# Patient Record
Sex: FEMALE | Race: WHITE | NOT HISPANIC OR LATINO | URBAN - METROPOLITAN AREA
[De-identification: names, ages, dates, MRNs, and addresses within clinical notes are randomized per-mention and may not be internally consistent; named-entity substitution may affect disease eponyms.]

---

## 2021-03-31 DIAGNOSIS — Z23 ENCOUNTER FOR IMMUNIZATION: ICD-10-CM

## 2021-08-24 ENCOUNTER — OFFICE VISIT (OUTPATIENT)
Dept: FAMILY MEDICINE | Facility: CLINIC | Age: 41
End: 2021-08-24
Payer: COMMERCIAL

## 2021-08-24 VITALS
WEIGHT: 227 LBS | HEIGHT: 67 IN | DIASTOLIC BLOOD PRESSURE: 88 MMHG | SYSTOLIC BLOOD PRESSURE: 120 MMHG | OXYGEN SATURATION: 97 % | BODY MASS INDEX: 35.63 KG/M2 | RESPIRATION RATE: 16 BRPM | HEART RATE: 90 BPM

## 2021-08-24 DIAGNOSIS — G43.109 MIGRAINE WITH AURA AND WITHOUT STATUS MIGRAINOSUS, NOT INTRACTABLE: Primary | ICD-10-CM

## 2021-08-24 PROBLEM — E66.9 OBESITY: Status: ACTIVE | Noted: 2021-08-24

## 2021-08-24 PROBLEM — N60.19 FIBROCYSTIC DISEASE OF BREAST: Status: ACTIVE | Noted: 2021-08-24

## 2021-08-24 PROCEDURE — 3008F BODY MASS INDEX DOCD: CPT | Performed by: FAMILY MEDICINE

## 2021-08-24 PROCEDURE — 99213 OFFICE O/P EST LOW 20 MIN: CPT | Performed by: FAMILY MEDICINE

## 2021-08-24 RX ORDER — BUTALBITAL, ACETAMINOPHEN AND CAFFEINE 50; 325; 40 MG/1; MG/1; MG/1
1 TABLET ORAL DAILY PRN
Qty: 10 TABLET | Refills: 0 | Status: SHIPPED | OUTPATIENT
Start: 2021-08-24 | End: 2024-05-06

## 2021-08-24 RX ORDER — LAMOTRIGINE 100 MG/1
TABLET ORAL DAILY
COMMUNITY
End: 2021-08-24 | Stop reason: DRUGHIGH

## 2021-08-24 RX ORDER — BUPROPION HYDROCHLORIDE 100 MG/1
100 TABLET ORAL 2 TIMES DAILY
COMMUNITY
End: 2022-08-30 | Stop reason: ALTCHOICE

## 2021-08-24 RX ORDER — LAMOTRIGINE 150 MG/1
150 TABLET ORAL DAILY
COMMUNITY
End: 2022-08-30 | Stop reason: ALTCHOICE

## 2021-08-24 RX ORDER — ALPRAZOLAM 0.5 MG/1
TABLET ORAL
COMMUNITY
Start: 2021-08-20

## 2021-08-24 RX ORDER — BUPROPION HYDROCHLORIDE 150 MG/1
TABLET ORAL
COMMUNITY
Start: 2021-08-17 | End: 2021-08-24 | Stop reason: DRUGHIGH

## 2021-08-24 RX ORDER — TRAZODONE HYDROCHLORIDE 50 MG/1
TABLET ORAL
COMMUNITY
Start: 2021-06-15 | End: 2021-08-24

## 2021-08-24 ASSESSMENT — PAIN SCALES - GENERAL: PAINLEVEL: 8

## 2021-08-24 NOTE — ASSESSMENT & PLAN NOTE
Discussed she has not had bw since 2017.  Again explained to her that Has could be due to another underlying condition.   Says her periods have been heavy & she will see Gyn.  Agrees to get bw done.

## 2021-08-24 NOTE — PROGRESS NOTES
" [facility] - [facility]  Dr. Elmira Andrews  [address] [place] [address]     Annealexjoe Rodas is a 41 y.o. female who present for   Chief Complaint   Patient presents with   • Follow-up     Migraines         Last mo had 5 HA episodes.  Been stressed. Mom w/them now & she has multiple medical problems.  Daughter back home taking a yr off.  Had an appoint for bw today but now having her period that is heavy & was almost feeling like passing out.    Wants previous completed forms corrected as she had to call off 5 times last mo & not twice.          Past Medical History:   Diagnosis Date   • Anxiety    • Depression    • Dermatitis        Past Surgical History:   Procedure Laterality Date   • REDUCTION MAMMAPLASTY         Social History     Occupational History   • Occupation:     Tobacco Use   • Smoking status: Former Smoker   • Smokeless tobacco: Never Used   Vaping Use   • Vaping Use: Never used   Substance and Sexual Activity   • Alcohol use: Never   • Drug use: Never   • Sexual activity: Not on file        Family History   Problem Relation Age of Onset   • Bipolar disorder Biological Mother    • Cervical cancer Biological Mother    • Cancer Biological Father    • Cancer Maternal Grandfather        Sertraline      Current Outpatient Medications:   •  buPROPion (WELLBUTRIN) 100 mg tablet, Take 100 mg by mouth 2 (two) times a day., Disp: , Rfl:   •  lamoTRIgine (LaMICtal) 150 mg tablet, Take 150 mg by mouth daily., Disp: , Rfl:   •  ALPRAZolam (XANAX) 0.5 mg tablet, , Disp: , Rfl:   •  butalbital-acetaminophen-caff (FIORICET, ESGIC) -40 mg per tablet, Take 1 tablet by mouth daily as needed for headaches or migraine for up to 10 days., Disp: 10 tablet, Rfl: 0    Review of Systems    Vitals:    08/24/21 1336   BP: 120/88   Pulse: 90   Resp: 16   SpO2: 97%   Weight: 103 kg (227 lb)   Height: 1.689 m (5' 6.5\")     Body mass index is 36.09 kg/m².    Physical Exam      "   Assessment   Problem List Items Addressed This Visit        Nervous    Migraine with aura, not intractable - Primary     Discussed she has not had bw since 2017.  Again explained to her that Has could be due to another underlying condition.   Says her periods have been heavy & she will see Gyn.  Agrees to get bw done.         Relevant Medications    butalbital-acetaminophen-caff (FIORICET, ESGIC) -40 mg per tablet    buPROPion (WELLBUTRIN) 100 mg tablet    lamoTRIgine (LaMICtal) 150 mg tablet              Elmira Andrews MD  8/24/2021

## 2022-02-08 ENCOUNTER — TELEPHONE (OUTPATIENT)
Dept: FAMILY MEDICINE | Facility: CLINIC | Age: 42
End: 2022-02-08
Payer: COMMERCIAL

## 2022-02-08 NOTE — TELEPHONE ENCOUNTER
Pt missed some days of work due to migraines and her HR department states they need a return to work note stating she is able to return to work tomorrow.    Urgent care can not provide a note. Is this something you would be able to help with? Thanks.

## 2022-02-08 NOTE — LETTER
February 8, 2022     Patient: Loreto Rodas  YOB: 1980  Date of Visit: 2/8/2022    To Whom it May Concern:     Please excuse Loreto for her absence from work as she was under our care. Loreto Rodas, may return to work tomorrow 02/09/2022 without any restrictions.    If you have any questions or concerns, please don't hesitate to call.         Sincerely,       Sathya Lopez RN          CC: No Recipients

## 2022-02-18 ENCOUNTER — TELEPHONE (OUTPATIENT)
Dept: FAMILY MEDICINE | Facility: CLINIC | Age: 42
End: 2022-02-18
Payer: COMMERCIAL

## 2022-02-18 NOTE — TELEPHONE ENCOUNTER
He was the doctor from her employer.  Her previous intermittent leave was up to February 10, 2022.  He was inquiring whether it needed to be extended and the frequency.  Please call patient and schedule a follow-up appointment to address above.

## 2022-05-18 ENCOUNTER — OFFICE VISIT (OUTPATIENT)
Dept: FAMILY MEDICINE | Facility: CLINIC | Age: 42
End: 2022-05-18
Payer: COMMERCIAL

## 2022-05-18 VITALS
BODY MASS INDEX: 39.99 KG/M2 | HEIGHT: 65 IN | TEMPERATURE: 97.6 F | SYSTOLIC BLOOD PRESSURE: 120 MMHG | RESPIRATION RATE: 16 BRPM | OXYGEN SATURATION: 97 % | WEIGHT: 240 LBS | HEART RATE: 113 BPM | DIASTOLIC BLOOD PRESSURE: 100 MMHG

## 2022-05-18 DIAGNOSIS — F32.A DEPRESSION, UNSPECIFIED DEPRESSION TYPE: Primary | ICD-10-CM

## 2022-05-18 DIAGNOSIS — G43.109 MIGRAINE WITH AURA AND WITHOUT STATUS MIGRAINOSUS, NOT INTRACTABLE: ICD-10-CM

## 2022-05-18 DIAGNOSIS — E66.812 CLASS 2 OBESITY WITHOUT SERIOUS COMORBIDITY WITH BODY MASS INDEX (BMI) OF 39.0 TO 39.9 IN ADULT, UNSPECIFIED OBESITY TYPE: ICD-10-CM

## 2022-05-18 DIAGNOSIS — R03.0 ELEVATED BLOOD PRESSURE READING: ICD-10-CM

## 2022-05-18 PROCEDURE — 3008F BODY MASS INDEX DOCD: CPT | Performed by: FAMILY MEDICINE

## 2022-05-18 PROCEDURE — 99214 OFFICE O/P EST MOD 30 MIN: CPT | Performed by: FAMILY MEDICINE

## 2022-05-18 NOTE — PROGRESS NOTES
" [facility] - [facility]  Dr. Elmira Andrews  [address] [place] [address]     Annealexjoe Rodas is a 42 y.o. female who present for   Chief Complaint   Patient presents with   • Forms/questionnaires     FMLA form to be filled out.         She is now sleeping separately in another room and may soon go through a divorce.  Very stressed.  Headaches several times a week.          Past Medical History:   Diagnosis Date   • Anxiety    • Depression    • Dermatitis        Past Surgical History:   Procedure Laterality Date   • REDUCTION MAMMAPLASTY         Social History     Occupational History   • Occupation:     Tobacco Use   • Smoking status: Former Smoker   • Smokeless tobacco: Never Used   Vaping Use   • Vaping Use: Never used   Substance and Sexual Activity   • Alcohol use: Never   • Drug use: Never   • Sexual activity: Not on file        Family History   Problem Relation Age of Onset   • Bipolar disorder Biological Mother    • Cervical cancer Biological Mother    • Uterine cancer Biological Mother    • Cancer Biological Father    • Cancer Maternal Grandfather        Sertraline      Current Outpatient Medications:   •  ALPRAZolam (XANAX) 0.5 mg tablet, , Disp: , Rfl:   •  buPROPion (WELLBUTRIN) 100 mg tablet, Take 100 mg by mouth 2 (two) times a day., Disp: , Rfl:   •  butalbital-acetaminophen-caff (FIORICET, ESGIC) -40 mg per tablet, Take 1 tablet by mouth daily as needed for headaches or migraine for up to 10 days., Disp: 10 tablet, Rfl: 0  •  lamoTRIgine (LaMICtal) 150 mg tablet, Take 150 mg by mouth daily., Disp: , Rfl:     Review of Systems   Genitourinary: Positive for menstrual problem (erratic, heavy, absent).       Vitals:    05/18/22 1515   BP: (!) 120/100   Pulse: (!) 113   Resp: 16   Temp: 36.4 °C (97.6 °F)   SpO2: 97%   Weight: 109 kg (240 lb)   Height: 1.651 m (5' 5\")     Body mass index is 39.94 kg/m².    Physical Exam  Vitals reviewed.   Eyes:      " Conjunctiva/sclera: Conjunctivae normal.   Cardiovascular:      Rate and Rhythm: Normal rate and regular rhythm.      Heart sounds: Normal heart sounds.   Abdominal:      Palpations: Abdomen is soft.      Tenderness: There is no abdominal tenderness.   Musculoskeletal:      Cervical back: Neck supple.      Right lower leg: No edema.      Left lower leg: No edema.   Neurological:      Mental Status: She is alert and oriented to person, place, and time.             Assessment   Problem List Items Addressed This Visit        Nervous    Migraine with aura, not intractable     Form completed.  Lab slip given for blood work.           Relevant Orders    CBC and Differential    Comprehensive metabolic panel    Lipid panel    TSH 3rd Generation    Urinalysis with Reflex Culture (ED and Outpatient only)       Circulatory    Elevated blood pressure reading     Patient made aware.  She will continue to monitor blood pressure.              Endocrine/Metabolic    Obesity       Other    Depression - Primary     Followed by psych.           Relevant Orders    CBC and Differential    Comprehensive metabolic panel    Lipid panel    TSH 3rd Generation    Urinalysis with Reflex Culture (ED and Outpatient only)              Elmira Andrews MD  5/24/2022

## 2022-06-10 ENCOUNTER — DOCUMENTATION (OUTPATIENT)
Dept: FAMILY MEDICINE | Facility: CLINIC | Age: 42
End: 2022-06-10
Payer: COMMERCIAL

## 2022-06-10 NOTE — PROGRESS NOTES
The doctor working for her employer year call to clarify the FMLA papers.  Approved 5-7 times a month for days ago for headaches and once a month for office visits.

## 2022-08-20 LAB
ALBUMIN SERPL-MCNC: 4.1 G/DL (ref 3.6–5.1)
ALBUMIN/GLOB SERPL: 1.7 (CALC) (ref 1–2.5)
ALP SERPL-CCNC: 71 U/L (ref 31–125)
ALT SERPL-CCNC: 31 U/L (ref 6–29)
APPEARANCE UR: CLEAR
AST SERPL-CCNC: 21 U/L (ref 10–30)
BACTERIA #/AREA URNS HPF: ABNORMAL /HPF
BACTERIA UR CULT: NORMAL
BASOPHILS # BLD AUTO: 49 CELLS/UL (ref 0–200)
BASOPHILS NFR BLD AUTO: 0.6 %
BILIRUB SERPL-MCNC: 0.6 MG/DL (ref 0.2–1.2)
BILIRUB UR QL STRIP: NEGATIVE
BUN SERPL-MCNC: 11 MG/DL (ref 7–25)
BUN/CREAT SERPL: ABNORMAL (CALC) (ref 6–22)
CALCIUM SERPL-MCNC: 9.2 MG/DL (ref 8.6–10.2)
CHLORIDE SERPL-SCNC: 104 MMOL/L (ref 98–110)
CHOLEST SERPL-MCNC: 200 MG/DL
CHOLEST/HDLC SERPL: 2.8 (CALC)
CO2 SERPL-SCNC: 24 MMOL/L (ref 20–32)
COLOR UR: YELLOW
CREAT SERPL-MCNC: 0.79 MG/DL (ref 0.5–0.99)
EGFRCR SERPLBLD CKD-EPI 2021: 97 ML/MIN/1.73M2
EOSINOPHIL # BLD AUTO: 162 CELLS/UL (ref 15–500)
EOSINOPHIL NFR BLD AUTO: 2 %
ERYTHROCYTE [DISTWIDTH] IN BLOOD BY AUTOMATED COUNT: 13.6 % (ref 11–15)
GLOBULIN SER CALC-MCNC: 2.4 G/DL (CALC) (ref 1.9–3.7)
GLUCOSE SERPL-MCNC: 92 MG/DL (ref 65–99)
GLUCOSE UR QL STRIP: NEGATIVE
HCT VFR BLD AUTO: 38.1 % (ref 35–45)
HDLC SERPL-MCNC: 72 MG/DL
HGB BLD-MCNC: 12.5 G/DL (ref 11.7–15.5)
HGB UR QL STRIP: NEGATIVE
HYALINE CASTS #/AREA URNS LPF: ABNORMAL /LPF
KETONES UR QL STRIP: ABNORMAL
LDLC SERPL CALC-MCNC: 111 MG/DL (CALC)
LEUKOCYTE ESTERASE UR QL STRIP: NEGATIVE
LYMPHOCYTES # BLD AUTO: 2228 CELLS/UL (ref 850–3900)
LYMPHOCYTES NFR BLD AUTO: 27.5 %
MCH RBC QN AUTO: 29.5 PG (ref 27–33)
MCHC RBC AUTO-ENTMCNC: 32.8 G/DL (ref 32–36)
MCV RBC AUTO: 89.9 FL (ref 80–100)
MONOCYTES # BLD AUTO: 583 CELLS/UL (ref 200–950)
MONOCYTES NFR BLD AUTO: 7.2 %
NEUTROPHILS # BLD AUTO: 5079 CELLS/UL (ref 1500–7800)
NEUTROPHILS NFR BLD AUTO: 62.7 %
NITRITE UR QL STRIP: NEGATIVE
NONHDLC SERPL-MCNC: 128 MG/DL (CALC)
PH UR STRIP: 6 [PH] (ref 5–8)
PLATELET # BLD AUTO: 303 THOUSAND/UL (ref 140–400)
PMV BLD REES-ECKER: 10.5 FL (ref 7.5–12.5)
POTASSIUM SERPL-SCNC: 4.2 MMOL/L (ref 3.5–5.3)
PROT SERPL-MCNC: 6.5 G/DL (ref 6.1–8.1)
PROT UR QL STRIP: NEGATIVE
RBC # BLD AUTO: 4.24 MILLION/UL (ref 3.8–5.1)
RBC #/AREA URNS HPF: ABNORMAL /HPF
SODIUM SERPL-SCNC: 138 MMOL/L (ref 135–146)
SP GR UR STRIP: 1.02 (ref 1–1.03)
SQUAMOUS #/AREA URNS HPF: ABNORMAL /HPF
TRIGL SERPL-MCNC: 76 MG/DL
TSH SERPL-ACNC: 4.24 MIU/L
WBC # BLD AUTO: 8.1 THOUSAND/UL (ref 3.8–10.8)
WBC #/AREA URNS HPF: ABNORMAL /HPF

## 2022-08-30 ENCOUNTER — OFFICE VISIT (OUTPATIENT)
Dept: FAMILY MEDICINE | Facility: CLINIC | Age: 42
End: 2022-08-30
Payer: COMMERCIAL

## 2022-08-30 VITALS
SYSTOLIC BLOOD PRESSURE: 100 MMHG | DIASTOLIC BLOOD PRESSURE: 70 MMHG | BODY MASS INDEX: 39.92 KG/M2 | WEIGHT: 239.6 LBS | RESPIRATION RATE: 16 BRPM | HEART RATE: 84 BPM | OXYGEN SATURATION: 98 % | HEIGHT: 65 IN

## 2022-08-30 DIAGNOSIS — F32.A DEPRESSION, UNSPECIFIED DEPRESSION TYPE: ICD-10-CM

## 2022-08-30 DIAGNOSIS — Z12.31 ENCOUNTER FOR SCREENING MAMMOGRAM FOR MALIGNANT NEOPLASM OF BREAST: ICD-10-CM

## 2022-08-30 DIAGNOSIS — G43.109 MIGRAINE WITH AURA AND WITHOUT STATUS MIGRAINOSUS, NOT INTRACTABLE: Primary | ICD-10-CM

## 2022-08-30 PROCEDURE — 99213 OFFICE O/P EST LOW 20 MIN: CPT | Performed by: FAMILY MEDICINE

## 2022-08-30 PROCEDURE — 3008F BODY MASS INDEX DOCD: CPT | Performed by: FAMILY MEDICINE

## 2022-08-30 RX ORDER — TRAZODONE HYDROCHLORIDE 50 MG/1
TABLET ORAL
COMMUNITY

## 2022-08-30 RX ORDER — LURASIDONE HYDROCHLORIDE 20 MG/1
TABLET, FILM COATED ORAL
COMMUNITY
End: 2022-12-20 | Stop reason: ALTCHOICE

## 2022-08-30 ASSESSMENT — PATIENT HEALTH QUESTIONNAIRE - PHQ9: SUM OF ALL RESPONSES TO PHQ9 QUESTIONS 1 & 2: 0

## 2022-08-30 NOTE — PROGRESS NOTES
[facility] - [facility]  Dr. Elmira Andrews  [address] [place] [address]     Annealexjoe Rodas is a 42 y.o. female who present for   Chief Complaint   Patient presents with   • Follow-up        Patient here to get [facility] papers completed.  She continues to live in the same house with her  who she is  from.  Both her children are getting therapy.  Her  was recently diagnosed with diabetes when he asked her for some care.  She is seeing therapist and psychiatrist.  Her mood has stabilized with the addition of Latuda.  She takes trazodone as needed.  Patient thinks her liver enzyme was slightly up to 2 and increase in alcohol intake.  She is going on a retreat which is going to be a health retreat to take care of herself.          Past Medical History:   Diagnosis Date   • Anxiety    • Depression    • Dermatitis        Past Surgical History:   Procedure Laterality Date   • REDUCTION MAMMAPLASTY         Social History     Occupational History   • Occupation:     Tobacco Use   • Smoking status: Former Smoker   • Smokeless tobacco: Never Used   Vaping Use   • Vaping Use: Never used   Substance and Sexual Activity   • Alcohol use: Never   • Drug use: Never   • Sexual activity: Defer        Family History   Problem Relation Age of Onset   • Bipolar disorder Biological Mother    • Cervical cancer Biological Mother    • Uterine cancer Biological Mother    • Cancer Biological Father    • Cancer Maternal Grandfather        Sertraline      Current Outpatient Medications:   •  ALPRAZolam (XANAX) 0.5 mg tablet, , Disp: , Rfl:   •  lurasidone (LATUDA) 20 mg tablet, Latuda 20 mg tablet, Disp: , Rfl:   •  traZODone (DESYREL) 50 mg tablet, trazodone 50 mg tablet, Disp: , Rfl:   •  butalbital-acetaminophen-caff (FIORICET, ESGIC) -40 mg per tablet, Take 1 tablet by mouth daily as needed for headaches or migraine for up to 10 days., Disp: 10 tablet, Rfl: 0    Review  "of Systems    Vitals:    08/30/22 1036   BP: 100/70   Pulse: 84   Resp: 16   SpO2: 98%   Weight: 109 kg (239 lb 9.6 oz)   Height: 1.651 m (5' 5\")     Body mass index is 39.87 kg/m².    Physical Exam  Vitals reviewed.   Eyes:      Conjunctiva/sclera: Conjunctivae normal.   Cardiovascular:      Rate and Rhythm: Normal rate and regular rhythm.      Heart sounds: Normal heart sounds.   Pulmonary:      Effort: Pulmonary effort is normal.      Breath sounds: Normal breath sounds.   Abdominal:      Palpations: Abdomen is soft.      Tenderness: There is no abdominal tenderness.   Musculoskeletal:      Cervical back: Neck supple.      Right lower leg: No edema.      Left lower leg: No edema.   Neurological:      Mental Status: She is alert and oriented to person, place, and time.             Assessment   Problem List Items Addressed This Visit        Mental Health    Depression    Relevant Medications    traZODone (DESYREL) 50 mg tablet    lurasidone (LATUDA) 20 mg tablet       Other    Migraine with aura, not intractable - Primary     Form completed.           Relevant Medications    traZODone (DESYREL) 50 mg tablet    Encounter for screening mammogram for malignant neoplasm of breast    Relevant Orders    BI SCREENING MAMMOGRAM BILATERAL(TOMOSYNTHESIS)              Elmira Andrews MD  8/30/2022     "

## 2022-12-15 ENCOUNTER — OFFICE VISIT (OUTPATIENT)
Dept: FAMILY MEDICINE | Facility: CLINIC | Age: 42
End: 2022-12-15
Payer: COMMERCIAL

## 2022-12-15 VITALS
DIASTOLIC BLOOD PRESSURE: 80 MMHG | TEMPERATURE: 97.9 F | HEIGHT: 65 IN | BODY MASS INDEX: 40.45 KG/M2 | OXYGEN SATURATION: 98 % | WEIGHT: 242.8 LBS | SYSTOLIC BLOOD PRESSURE: 120 MMHG | RESPIRATION RATE: 15 BRPM | HEART RATE: 76 BPM

## 2022-12-15 DIAGNOSIS — G43.109 MIGRAINE WITH AURA AND WITHOUT STATUS MIGRAINOSUS, NOT INTRACTABLE: Primary | ICD-10-CM

## 2022-12-15 DIAGNOSIS — F32.A DEPRESSION, UNSPECIFIED DEPRESSION TYPE: ICD-10-CM

## 2022-12-15 DIAGNOSIS — E66.812 CLASS 2 OBESITY WITHOUT SERIOUS COMORBIDITY WITH BODY MASS INDEX (BMI) OF 39.0 TO 39.9 IN ADULT, UNSPECIFIED OBESITY TYPE: ICD-10-CM

## 2022-12-15 PROCEDURE — 3008F BODY MASS INDEX DOCD: CPT | Performed by: FAMILY MEDICINE

## 2022-12-15 PROCEDURE — 99213 OFFICE O/P EST LOW 20 MIN: CPT | Performed by: FAMILY MEDICINE

## 2022-12-15 NOTE — PROGRESS NOTES
[facility] - [facility]  Dr. Elmira Andrews  [address] [place] [address]     Annealexjoe Rodas is a 42 y.o. female who present for   Chief Complaint   Patient presents with   • Follow-up     Paperwork for fmla  Flu shot  Discuss migraine med         She is doing much better with her  after the therapy.  Has taken some days of in October due to recurrent headaches..          Past Medical History:   Diagnosis Date   • Anxiety    • Depression    • Dermatitis        Past Surgical History:   Procedure Laterality Date   • REDUCTION MAMMAPLASTY         Social History     Occupational History   • Occupation:     Tobacco Use   • Smoking status: Former   • Smokeless tobacco: Never   Vaping Use   • Vaping Use: Never used   Substance and Sexual Activity   • Alcohol use: Never   • Drug use: Never   • Sexual activity: Defer        Family History   Problem Relation Age of Onset   • Bipolar disorder Biological Mother    • Cervical cancer Biological Mother    • Uterine cancer Biological Mother    • Cancer Biological Father    • Cancer Maternal Grandfather        Sertraline      Current Outpatient Medications:   •  ALPRAZolam (XANAX) 0.5 mg tablet, , Disp: , Rfl:   •  traZODone (DESYREL) 50 mg tablet, trazodone 50 mg tablet, Disp: , Rfl:   •  butalbital-acetaminophen-caff (FIORICET, ESGIC) -40 mg per tablet, Take 1 tablet by mouth daily as needed for headaches or migraine for up to 10 days., Disp: 10 tablet, Rfl: 0    Review of Systems   Constitutional: Negative.    HENT: Negative.    Eyes: Negative.    Respiratory: Negative.    Cardiovascular: Negative.    Gastrointestinal: Negative.    Endocrine: Negative.    Genitourinary: Negative.    Musculoskeletal: Negative.    Skin: Negative.    Allergic/Immunologic: Negative.    Neurological: Positive for headaches.   Hematological: Negative.    Psychiatric/Behavioral: The patient is nervous/anxious.        Vitals:    12/15/22 1411  "  BP: 120/80   BP Location: Right upper arm   Patient Position: Sitting   Pulse: 76   Resp: 15   Temp: 36.6 °C (97.9 °F)   TempSrc: Oral   SpO2: 98%   Weight: 110 kg (242 lb 12.8 oz)   Height: 1.651 m (5' 5\")     Body mass index is 40.4 kg/m².    Physical Exam  Vitals reviewed.   Constitutional:       Appearance: Normal appearance.   HENT:      Right Ear: Tympanic membrane normal.      Left Ear: Tympanic membrane normal.      Mouth/Throat:      Mouth: Mucous membranes are moist.   Eyes:      Conjunctiva/sclera: Conjunctivae normal.   Cardiovascular:      Rate and Rhythm: Normal rate.      Heart sounds: Normal heart sounds. No murmur heard.  Pulmonary:      Effort: Pulmonary effort is normal.      Breath sounds: Normal breath sounds. No rales.   Abdominal:      General: Bowel sounds are normal.      Palpations: Abdomen is soft.      Tenderness: There is no abdominal tenderness.   Musculoskeletal:         General: Normal range of motion.      Cervical back: Normal range of motion.      Right lower leg: No edema.      Left lower leg: No edema.   Lymphadenopathy:      Cervical: No cervical adenopathy.   Skin:     General: Skin is warm.   Neurological:      General: No focal deficit present.      Mental Status: She is alert and oriented to person, place, and time.   Psychiatric:         Mood and Affect: Mood normal.             Assessment   Problem List Items Addressed This Visit        Endocrine/Metabolic    Obesity     Discussed diet and exercises.         Relevant Orders    Ambulatory referral to [facility]       Mental Health    Depression     Sees psychiatry.            Other    Migraine with aura, not intractable - Primary     FMLA papers completed.                Elmira Andrews MD  12/20/2022     "

## 2022-12-20 ASSESSMENT — ENCOUNTER SYMPTOMS
EYES NEGATIVE: 1
NERVOUS/ANXIOUS: 1
MUSCULOSKELETAL NEGATIVE: 1
ENDOCRINE NEGATIVE: 1
HEADACHES: 1
GASTROINTESTINAL NEGATIVE: 1
CARDIOVASCULAR NEGATIVE: 1
RESPIRATORY NEGATIVE: 1
HEMATOLOGIC/LYMPHATIC NEGATIVE: 1
ALLERGIC/IMMUNOLOGIC NEGATIVE: 1
CONSTITUTIONAL NEGATIVE: 1

## 2023-05-19 ENCOUNTER — CONSULT (OUTPATIENT)
Dept: FAMILY MEDICINE | Facility: CLINIC | Age: 43
End: 2023-05-19
Payer: COMMERCIAL

## 2023-05-19 VITALS
RESPIRATION RATE: 16 BRPM | BODY MASS INDEX: 39.12 KG/M2 | TEMPERATURE: 97.6 F | HEIGHT: 66 IN | HEART RATE: 103 BPM | WEIGHT: 243.4 LBS | OXYGEN SATURATION: 99 % | DIASTOLIC BLOOD PRESSURE: 82 MMHG | SYSTOLIC BLOOD PRESSURE: 140 MMHG

## 2023-05-19 DIAGNOSIS — R94.31 ABNORMAL EKG: ICD-10-CM

## 2023-05-19 DIAGNOSIS — Z01.818 PREOP EXAMINATION: ICD-10-CM

## 2023-05-19 DIAGNOSIS — E66.812 CLASS 2 OBESITY WITHOUT SERIOUS COMORBIDITY WITH BODY MASS INDEX (BMI) OF 39.0 TO 39.9 IN ADULT, UNSPECIFIED OBESITY TYPE: ICD-10-CM

## 2023-05-19 DIAGNOSIS — I10 HYPERTENSION, UNSPECIFIED TYPE: Primary | ICD-10-CM

## 2023-05-19 PROBLEM — R03.0 ELEVATED BLOOD PRESSURE READING: Status: RESOLVED | Noted: 2022-05-18 | Resolved: 2023-05-19

## 2023-05-19 PROCEDURE — 3079F DIAST BP 80-89 MM HG: CPT | Performed by: FAMILY MEDICINE

## 2023-05-19 PROCEDURE — 99214 OFFICE O/P EST MOD 30 MIN: CPT | Performed by: FAMILY MEDICINE

## 2023-05-19 PROCEDURE — 93000 ELECTROCARDIOGRAM COMPLETE: CPT | Performed by: FAMILY MEDICINE

## 2023-05-19 PROCEDURE — 3008F BODY MASS INDEX DOCD: CPT | Performed by: FAMILY MEDICINE

## 2023-05-19 PROCEDURE — 3077F SYST BP >= 140 MM HG: CPT | Performed by: FAMILY MEDICINE

## 2023-05-19 RX ORDER — METOPROLOL SUCCINATE 25 MG/1
25 TABLET, EXTENDED RELEASE ORAL DAILY
Qty: 90 TABLET | Refills: 1 | Status: SHIPPED
Start: 2023-05-19 | End: 2023-12-22

## 2023-05-19 NOTE — PROGRESS NOTES
[facility]  Dr. Elmira Andrews  [address] [place] [address]     Loreto Rodas is a 43 y.o. female who present for   Chief Complaint   Patient presents with   • Pre-op Exam        Pt presents for a Pre-Op exam for upciming surgery.  Requesting Physician:Dr Grove  Reason for request: Medical clearance  Surgery or Procedure to be done: D&C, hysteroscopy  Blood pressure log reviewed, diastolic mostly high.          Past Medical History:   Diagnosis Date   • Anxiety    • Depression    • Dermatitis        Past Surgical History:   Procedure Laterality Date   • REDUCTION MAMMAPLASTY         Social History     Occupational History   • Occupation:     Tobacco Use   • Smoking status: Former   • Smokeless tobacco: Never   Vaping Use   • Vaping status: Never Used     Passive vaping exposure: Yes   Substance and Sexual Activity   • Alcohol use: Never   • Drug use: Never   • Sexual activity: Defer        Family History   Problem Relation Age of Onset   • Bipolar disorder Biological Mother    • Cervical cancer Biological Mother    • Uterine cancer Biological Mother    • Cancer Biological Father    • Cancer Maternal Grandfather        Sertraline      Current Outpatient Medications:   •  ALPRAZolam (XANAX) 0.5 mg tablet, , Disp: , Rfl:   •  metoprolol succinate XL (TOPROL-XL) 25 mg 24 hr tablet, Take 1 tablet (25 mg total) by mouth daily., Disp: 90 tablet, Rfl: 1  •  traZODone (DESYREL) 50 mg tablet, trazodone 50 mg tablet, Disp: , Rfl:   •  butalbital-acetaminophen-caff (FIORICET, ESGIC) -40 mg per tablet, Take 1 tablet by mouth daily as needed for headaches or migraine for up to 10 days., Disp: 10 tablet, Rfl: 0    Review of Systems   Genitourinary: Positive for menstrual problem (Heavy menses, intermenstrual bleeding).       Vitals:    05/19/23 1453   BP: 140/82   BP Location: Right upper arm   Patient Position: Sitting   Pulse: (!) 103   Resp: 16   Temp:  "36.4 °C (97.6 °F)   TempSrc: Oral   SpO2: 99%   Weight: 110 kg (243 lb 6.4 oz)   Height: 1.664 m (5' 5.5\")     Body mass index is 39.89 kg/m².    Physical Exam  Vitals reviewed.   Constitutional:       Appearance: Normal appearance.   HENT:      Right Ear: Tympanic membrane normal.      Left Ear: Tympanic membrane normal.      Mouth/Throat:      Mouth: Mucous membranes are moist.   Eyes:      Conjunctiva/sclera: Conjunctivae normal.   Cardiovascular:      Rate and Rhythm: Normal rate.      Heart sounds: Normal heart sounds. No murmur heard.  Pulmonary:      Effort: Pulmonary effort is normal.      Breath sounds: Normal breath sounds. No rales.   Abdominal:      General: Bowel sounds are normal.      Palpations: Abdomen is soft.      Tenderness: There is no abdominal tenderness.   Musculoskeletal:         General: Normal range of motion.      Cervical back: Normal range of motion.      Right lower leg: No edema.      Left lower leg: No edema.   Lymphadenopathy:      Cervical: No cervical adenopathy.   Skin:     General: Skin is warm.   Neurological:      General: No focal deficit present.      Mental Status: She is alert and oriented to person, place, and time.   Psychiatric:         Mood and Affect: Mood normal.             Assessment   Problem List Items Addressed This Visit        Circulatory    Hypertension - Primary     She agreed to start with metoprolol.         Relevant Medications    metoprolol succinate XL (TOPROL-XL) 25 mg 24 hr tablet    Other Relevant Orders    ECG 12 LEAD OFFICE PERFORMED (Completed)    Transthoracic echo (TTE) complete    CBC and Differential    Comprehensive metabolic panel    Lipid panel    TSH 3rd Generation    Urinalysis with Reflex Culture (ED and Outpatient only)    Hemoglobin A1c    Abnormal EKG     She agreed to get the echocardiogram done.         Relevant Orders    Transthoracic echo (TTE) complete    CBC and Differential    Comprehensive metabolic panel    Lipid panel    TSH " 3rd Generation    Urinalysis with Reflex Culture (ED and Outpatient only)    Hemoglobin A1c       Endocrine/Metabolic    Obesity     Advised to try orlistat over-the-counter.         Relevant Orders    CBC and Differential    Comprehensive metabolic panel    Lipid panel    TSH 3rd Generation    Urinalysis with Reflex Culture (ED and Outpatient only)    Hemoglobin A1c       Other    Preop examination     Form completed.  Procedure will be done at the hospital.                Elmira Andrews MD  5/19/2023

## 2023-08-11 ENCOUNTER — TELEPHONE (OUTPATIENT)
Dept: FAMILY MEDICINE | Facility: CLINIC | Age: 43
End: 2023-08-11
Payer: COMMERCIAL

## 2023-08-11 NOTE — TELEPHONE ENCOUNTER
Dr. Santiago called regarding patient he wanted to know if you are willing to extend the [place] Certification for patient? which has an expiration date of 8/29/2023. Also if so how long the extension will be for and also if there are any changes or frequencies to the duration of flare ups.     Dr. Santiago says if you cannot reach him directly that you can leave him a detailed message as it is his direct line and it is secure. Phone # : [phone].

## 2023-08-22 ENCOUNTER — TELEPHONE (OUTPATIENT)
Dept: FAMILY MEDICINE | Facility: CLINIC | Age: 43
End: 2023-08-22
Payer: COMMERCIAL

## 2023-08-22 NOTE — TELEPHONE ENCOUNTER
Dr. Santiago called about [facility] for this patient. He has some questions and wants you to give him a call back. He can be reached at [phone] and said it is time sensitive.

## 2023-09-19 ENCOUNTER — OFFICE VISIT (OUTPATIENT)
Dept: FAMILY MEDICINE | Facility: CLINIC | Age: 43
End: 2023-09-19
Payer: COMMERCIAL

## 2023-09-19 VITALS
BODY MASS INDEX: 38.59 KG/M2 | TEMPERATURE: 98.1 F | OXYGEN SATURATION: 98 % | WEIGHT: 240.13 LBS | DIASTOLIC BLOOD PRESSURE: 82 MMHG | HEART RATE: 72 BPM | SYSTOLIC BLOOD PRESSURE: 118 MMHG | HEIGHT: 66 IN

## 2023-09-19 DIAGNOSIS — G43.119 INTRACTABLE MIGRAINE WITH AURA WITHOUT STATUS MIGRAINOSUS: Primary | ICD-10-CM

## 2023-09-19 DIAGNOSIS — F32.A DEPRESSION, UNSPECIFIED DEPRESSION TYPE: ICD-10-CM

## 2023-09-19 DIAGNOSIS — F41.9 ANXIETY: ICD-10-CM

## 2023-09-19 PROCEDURE — 3008F BODY MASS INDEX DOCD: CPT | Performed by: FAMILY MEDICINE

## 2023-09-19 PROCEDURE — 99214 OFFICE O/P EST MOD 30 MIN: CPT | Performed by: FAMILY MEDICINE

## 2023-09-19 PROCEDURE — 3079F DIAST BP 80-89 MM HG: CPT | Performed by: FAMILY MEDICINE

## 2023-09-19 PROCEDURE — 3074F SYST BP LT 130 MM HG: CPT | Performed by: FAMILY MEDICINE

## 2023-09-19 RX ORDER — ESCITALOPRAM OXALATE 10 MG/1
TABLET ORAL
COMMUNITY
Start: 2023-07-25 | End: 2024-05-06

## 2023-09-19 NOTE — ASSESSMENT & PLAN NOTE
I have deferred giving her Fioricet.  Referred her to neurologist.  Discussed she needs to get blood work done prior to the forms being completed.  She agreed to do so within the next few days.

## 2023-09-19 NOTE — ASSESSMENT & PLAN NOTE
She has been getting 90 pills of Xanax every month.  Says she takes them when she feels the anxiety coming on which is frequent.  Prescribed by psychiatrist.

## 2023-09-19 NOTE — PROGRESS NOTES
[facility] - [facility]  Dr. Elmira Andrews  [address] [place] [address]     Loreto Rodas is a 43 y.o. female who present for   Chief Complaint   Patient presents with    FMLA        5 Has in Aug & 2 this mo.  Says she saw the  who recommended her to go off of the metoprolol as her blood pressure was very low.  She has been checking it at home and it has been normal.  Her relationship with her  has been much better, going for therapy.  She is going to [place] with him and son soon.  Patient would like to take a few pills of Fioricet to be taken if needed.          Past Medical History:   Diagnosis Date    Anxiety     Depression     Dermatitis     Intractable migraine with aura 8/24/2021       Past Surgical History:   Procedure Laterality Date    REDUCTION MAMMAPLASTY         Social History     Occupational History    Occupation:     Tobacco Use    Smoking status: Former    Smokeless tobacco: Never   Vaping Use    Vaping Use: Never used   Substance and Sexual Activity    Alcohol use: Never    Drug use: Never    Sexual activity: Defer        Family History   Problem Relation Age of Onset    Bipolar disorder Biological Mother     Cervical cancer Biological Mother     Uterine cancer Biological Mother     Cancer Biological Father     Cancer Maternal Grandfather        Sertraline      Current Outpatient Medications:     ALPRAZolam (XANAX) 0.5 mg tablet, , Disp: , Rfl:     butalbital-acetaminophen-caff (FIORICET, ESGIC) -40 mg per tablet, Take 1 tablet by mouth daily as needed for headaches or migraine for up to 10 days., Disp: 10 tablet, Rfl: 0    escitalopram (LEXAPRO) 10 mg tablet, , Disp: , Rfl:     traZODone (DESYREL) 50 mg tablet, trazodone 50 mg tablet, Disp: , Rfl:     metoprolol succinate XL (TOPROL-XL) 25 mg 24 hr tablet, Take 1 tablet (25 mg total) by mouth daily. (Patient not taking: Reported on 9/19/2023), Disp: 90  "tablet, Rfl: 1    Review of Systems    Vitals:    09/19/23 1333   BP: 118/82   BP Location: Right upper arm   Patient Position: Sitting   Pulse: 72   Temp: 36.7 °C (98.1 °F)   TempSrc: Temporal   SpO2: 98%   Weight: 109 kg (240 lb 2 oz)   Height: 1.664 m (5' 5.5\")     Body mass index is 39.35 kg/m².    Physical Exam  Vitals reviewed.   Eyes:      Conjunctiva/sclera: Conjunctivae normal.   Cardiovascular:      Rate and Rhythm: Normal rate and regular rhythm.      Heart sounds: Normal heart sounds.   Pulmonary:      Effort: Pulmonary effort is normal.      Breath sounds: Normal breath sounds.   Abdominal:      Palpations: Abdomen is soft.      Tenderness: There is no abdominal tenderness.   Musculoskeletal:      Cervical back: Neck supple.      Right lower leg: No edema.      Left lower leg: No edema.   Neurological:      Mental Status: She is alert and oriented to person, place, and time.             Assessment   Problem List Items Addressed This Visit        Mental Health    Depression     Taking Lexapro daily.  She takes trazodone as needed.         Relevant Medications    escitalopram (LEXAPRO) 10 mg tablet    Anxiety     She has been getting 90 pills of Xanax every month.  Says she takes them when she feels the anxiety coming on which is frequent.  Prescribed by psychiatrist.            Other    Intractable migraine with aura - Primary     I have deferred giving her Fioricet.  Referred her to neurologist.  Discussed she needs to get blood work done prior to the forms being completed.  She agreed to do so within the next few days.         Relevant Medications    escitalopram (LEXAPRO) 10 mg tablet    Other Relevant Orders    Ambulatory referral to Neurology           Elmira Andrews MD  9/19/2023     "

## 2023-09-25 ENCOUNTER — DOCUMENTATION (OUTPATIENT)
Dept: FAMILY MEDICINE | Facility: CLINIC | Age: 43
End: 2023-09-25
Payer: COMMERCIAL

## 2023-09-25 NOTE — PROGRESS NOTES
Radha Honeycutt MA has completed a chart review for Loreto Rodas and have determined that the care gap has been satisfied.    [facility]    Care Gap(s) Identified: Breast Cancer Screening    Chart Review Completed: Yes    Pt completed mammogram on 3/9/2023, no outreach needed.

## 2023-09-27 LAB
ALBUMIN SERPL-MCNC: 4.2 G/DL (ref 3.6–5.1)
ALBUMIN/GLOB SERPL: 1.8 (CALC) (ref 1–2.5)
ALP SERPL-CCNC: 73 U/L (ref 31–125)
ALT SERPL-CCNC: 12 U/L (ref 6–29)
APPEARANCE UR: CLEAR
AST SERPL-CCNC: 10 U/L (ref 10–30)
BACTERIA #/AREA URNS HPF: NORMAL /HPF
BACTERIA UR CULT: NORMAL
BASOPHILS # BLD AUTO: 58 CELLS/UL (ref 0–200)
BASOPHILS NFR BLD AUTO: 0.9 %
BILIRUB SERPL-MCNC: 0.5 MG/DL (ref 0.2–1.2)
BILIRUB UR QL STRIP: NEGATIVE
BUN SERPL-MCNC: 12 MG/DL (ref 7–25)
BUN/CREAT SERPL: NORMAL (CALC) (ref 6–22)
CALCIUM SERPL-MCNC: 9.3 MG/DL (ref 8.6–10.2)
CHLORIDE SERPL-SCNC: 105 MMOL/L (ref 98–110)
CHOLEST SERPL-MCNC: 186 MG/DL
CHOLEST/HDLC SERPL: 3.3 (CALC)
CO2 SERPL-SCNC: 27 MMOL/L (ref 20–32)
COLOR UR: YELLOW
CREAT SERPL-MCNC: 0.79 MG/DL (ref 0.5–0.99)
EGFRCR SERPLBLD CKD-EPI 2021: 96 ML/MIN/1.73M2
EOSINOPHIL # BLD AUTO: 192 CELLS/UL (ref 15–500)
EOSINOPHIL NFR BLD AUTO: 3 %
ERYTHROCYTE [DISTWIDTH] IN BLOOD BY AUTOMATED COUNT: 13.4 % (ref 11–15)
GLOBULIN SER CALC-MCNC: 2.3 G/DL (CALC) (ref 1.9–3.7)
GLUCOSE SERPL-MCNC: 94 MG/DL (ref 65–99)
GLUCOSE UR QL STRIP: NEGATIVE
HBA1C MFR BLD: 5.4 % OF TOTAL HGB
HCT VFR BLD AUTO: 36.9 % (ref 35–45)
HDLC SERPL-MCNC: 56 MG/DL
HGB BLD-MCNC: 12.1 G/DL (ref 11.7–15.5)
HGB UR QL STRIP: NEGATIVE
HYALINE CASTS #/AREA URNS LPF: NORMAL /LPF
KETONES UR QL STRIP: NEGATIVE
LDLC SERPL CALC-MCNC: 116 MG/DL (CALC)
LEUKOCYTE ESTERASE UR QL STRIP: NEGATIVE
LYMPHOCYTES # BLD AUTO: 2016 CELLS/UL (ref 850–3900)
LYMPHOCYTES NFR BLD AUTO: 31.5 %
MCH RBC QN AUTO: 28.6 PG (ref 27–33)
MCHC RBC AUTO-ENTMCNC: 32.8 G/DL (ref 32–36)
MCV RBC AUTO: 87.2 FL (ref 80–100)
MONOCYTES # BLD AUTO: 442 CELLS/UL (ref 200–950)
MONOCYTES NFR BLD AUTO: 6.9 %
NEUTROPHILS # BLD AUTO: 3693 CELLS/UL (ref 1500–7800)
NEUTROPHILS NFR BLD AUTO: 57.7 %
NITRITE UR QL STRIP: NEGATIVE
NONHDLC SERPL-MCNC: 130 MG/DL (CALC)
PH UR STRIP: 7.5 [PH] (ref 5–8)
PLATELET # BLD AUTO: 318 THOUSAND/UL (ref 140–400)
PMV BLD REES-ECKER: 11.7 FL (ref 7.5–12.5)
POTASSIUM SERPL-SCNC: 4.3 MMOL/L (ref 3.5–5.3)
PROT SERPL-MCNC: 6.5 G/DL (ref 6.1–8.1)
PROT UR QL STRIP: NEGATIVE
RBC # BLD AUTO: 4.23 MILLION/UL (ref 3.8–5.1)
RBC #/AREA URNS HPF: NORMAL /HPF
SERVICE CMNT-IMP: NORMAL
SODIUM SERPL-SCNC: 138 MMOL/L (ref 135–146)
SP GR UR STRIP: 1.01 (ref 1–1.03)
SQUAMOUS #/AREA URNS HPF: NORMAL /HPF
TRIGL SERPL-MCNC: 58 MG/DL
TSH SERPL-ACNC: 2.26 MIU/L
WBC # BLD AUTO: 6.4 THOUSAND/UL (ref 3.8–10.8)
WBC #/AREA URNS HPF: NORMAL /HPF

## 2023-09-28 ENCOUNTER — TELEPHONE (OUTPATIENT)
Dept: FAMILY MEDICINE | Facility: CLINIC | Age: 43
End: 2023-09-28
Payer: COMMERCIAL

## 2023-12-22 ENCOUNTER — OFFICE VISIT (OUTPATIENT)
Dept: FAMILY MEDICINE | Facility: CLINIC | Age: 43
End: 2023-12-22
Payer: COMMERCIAL

## 2023-12-22 VITALS
BODY MASS INDEX: 38.83 KG/M2 | HEIGHT: 66 IN | WEIGHT: 241.6 LBS | DIASTOLIC BLOOD PRESSURE: 94 MMHG | OXYGEN SATURATION: 97 % | TEMPERATURE: 98.1 F | SYSTOLIC BLOOD PRESSURE: 132 MMHG | HEART RATE: 81 BPM

## 2023-12-22 DIAGNOSIS — G43.119 INTRACTABLE MIGRAINE WITH AURA WITHOUT STATUS MIGRAINOSUS: Primary | ICD-10-CM

## 2023-12-22 DIAGNOSIS — E66.812 CLASS 2 OBESITY WITHOUT SERIOUS COMORBIDITY WITH BODY MASS INDEX (BMI) OF 39.0 TO 39.9 IN ADULT, UNSPECIFIED OBESITY TYPE: ICD-10-CM

## 2023-12-22 DIAGNOSIS — I10 HYPERTENSION, UNSPECIFIED TYPE: ICD-10-CM

## 2023-12-22 DIAGNOSIS — F41.9 ANXIETY: ICD-10-CM

## 2023-12-22 PROCEDURE — 3008F BODY MASS INDEX DOCD: CPT | Performed by: FAMILY MEDICINE

## 2023-12-22 PROCEDURE — 3075F SYST BP GE 130 - 139MM HG: CPT | Performed by: FAMILY MEDICINE

## 2023-12-22 PROCEDURE — 99213 OFFICE O/P EST LOW 20 MIN: CPT | Performed by: FAMILY MEDICINE

## 2023-12-22 PROCEDURE — 3080F DIAST BP >= 90 MM HG: CPT | Performed by: FAMILY MEDICINE

## 2023-12-22 RX ORDER — LAMOTRIGINE 100 MG/1
100 TABLET ORAL DAILY
COMMUNITY
Start: 2023-11-24

## 2023-12-22 RX ORDER — METHOCARBAMOL 500 MG/1
TABLET, FILM COATED ORAL
COMMUNITY
Start: 2023-10-09 | End: 2023-12-22

## 2023-12-22 ASSESSMENT — PATIENT HEALTH QUESTIONNAIRE - PHQ9: SUM OF ALL RESPONSES TO PHQ9 QUESTIONS 1 & 2: 0

## 2023-12-22 NOTE — PROGRESS NOTES
[facility]  Dr. Elmira Andrews  [address] [place] [address]     Annealexjoe Rodas is a 43 y.o. female who present for   Chief Complaint   Patient presents with   • work note        Patient has been unable to go to work since December 14.  She started having headaches, some of them she knows are tensional headaches.  They will be moving soon and she has been busy trying to get everything ready.          Past Medical History:   Diagnosis Date   • Anxiety    • Depression    • Dermatitis    • Intractable migraine with aura 8/24/2021       Past Surgical History:   Procedure Laterality Date   • REDUCTION MAMMAPLASTY         Social History     Occupational History   • Occupation:     Tobacco Use   • Smoking status: Former   • Smokeless tobacco: Never   Vaping Use   • Vaping Use: Never used   Substance and Sexual Activity   • Alcohol use: Never   • Drug use: Never   • Sexual activity: Defer        Family History   Problem Relation Age of Onset   • Bipolar disorder Biological Mother    • Cervical cancer Biological Mother    • Uterine cancer Biological Mother    • Cancer Biological Father    • Cancer Maternal Grandfather        Sertraline      Current Outpatient Medications:   •  ALPRAZolam (XANAX) 0.5 mg tablet, , Disp: , Rfl:   •  butalbital-acetaminophen-caff (FIORICET, ESGIC) -40 mg per tablet, Take 1 tablet by mouth daily as needed for headaches or migraine for up to 10 days., Disp: 10 tablet, Rfl: 0  •  escitalopram (LEXAPRO) 10 mg tablet, , Disp: , Rfl:   •  lamoTRIgine (LaMICtal) 100 mg tablet, Take 50 mg by mouth daily., Disp: , Rfl:   •  traZODone (DESYREL) 50 mg tablet, trazodone 50 mg tablet, Disp: , Rfl:     Review of Systems    Vitals:    12/22/23 1236   BP: (!) 132/94   BP Location: Right upper arm   Patient Position: Sitting   Pulse: 81   Temp: 36.7 °C (98.1 °F)   TempSrc: Oral   SpO2: 97%   Weight: 110 kg (241 lb 9.6 oz)   Height: 1.664 m (5'  "5.5\")     Body mass index is 39.59 kg/m².    Physical Exam  Vitals reviewed.   Eyes:      Conjunctiva/sclera: Conjunctivae normal.   Cardiovascular:      Rate and Rhythm: Normal rate and regular rhythm.      Heart sounds: Normal heart sounds.   Pulmonary:      Effort: Pulmonary effort is normal.      Breath sounds: Normal breath sounds.   Abdominal:      Palpations: Abdomen is soft.      Tenderness: There is no abdominal tenderness.   Musculoskeletal:      Cervical back: Neck supple.      Right lower leg: No edema.      Left lower leg: No edema.   Neurological:      Mental Status: She is alert and oriented to person, place, and time.             Assessment   Problem List Items Addressed This Visit        Circulatory    Hypertension     Patient made aware blood pressure is high.            Endocrine/Metabolic    Obesity       Mental Health    Anxiety     She continues to see psychiatrist once a month.            Other    Intractable migraine with aura - Primary     She will be seeing a new neurologist next month.  Letter made per her request.         Relevant Medications    lamoTRIgine (LaMICtal) 100 mg tablet           Elmira Andrews MD  12/23/2023     "

## 2024-05-06 ENCOUNTER — OFFICE VISIT (OUTPATIENT)
Dept: FAMILY MEDICINE | Facility: CLINIC | Age: 44
End: 2024-05-06
Payer: COMMERCIAL

## 2024-05-06 VITALS
HEIGHT: 66 IN | BODY MASS INDEX: 38.41 KG/M2 | TEMPERATURE: 97.8 F | HEART RATE: 50 BPM | WEIGHT: 239 LBS | SYSTOLIC BLOOD PRESSURE: 120 MMHG | DIASTOLIC BLOOD PRESSURE: 88 MMHG | OXYGEN SATURATION: 99 %

## 2024-05-06 DIAGNOSIS — G43.119 INTRACTABLE MIGRAINE WITH AURA WITHOUT STATUS MIGRAINOSUS: Primary | ICD-10-CM

## 2024-05-06 DIAGNOSIS — I10 HYPERTENSION, UNSPECIFIED TYPE: ICD-10-CM

## 2024-05-06 DIAGNOSIS — E66.812 CLASS 2 OBESITY WITHOUT SERIOUS COMORBIDITY WITH BODY MASS INDEX (BMI) OF 39.0 TO 39.9 IN ADULT, UNSPECIFIED OBESITY TYPE: ICD-10-CM

## 2024-05-06 PROCEDURE — 99214 OFFICE O/P EST MOD 30 MIN: CPT | Performed by: FAMILY MEDICINE

## 2024-05-06 PROCEDURE — 3079F DIAST BP 80-89 MM HG: CPT | Performed by: FAMILY MEDICINE

## 2024-05-06 PROCEDURE — 3008F BODY MASS INDEX DOCD: CPT | Performed by: FAMILY MEDICINE

## 2024-05-06 PROCEDURE — 3074F SYST BP LT 130 MM HG: CPT | Performed by: FAMILY MEDICINE

## 2024-05-06 RX ORDER — DULOXETIN HYDROCHLORIDE 60 MG/1
CAPSULE, DELAYED RELEASE ORAL
COMMUNITY
Start: 2024-04-23

## 2024-05-06 RX ORDER — LURASIDONE HYDROCHLORIDE 60 MG/1
60 TABLET, FILM COATED ORAL DAILY
COMMUNITY
Start: 2024-03-12

## 2024-05-06 NOTE — LETTER
May 6, 2024     Patient: Loreto Rodas  YOB: 1980  Date of Visit: 5/6/2024    To Whom it May Concern:    Loreto Rodas was seen in my clinic on 5/6/2024 at 3:30 pm. Please excuse Loreto for her absence from work on this day to make the appointment.  Patient may need to take off up to 5 days a week as needed for her medical condition.  If you have any questions or concerns, please don't hesitate to call.         Sincerely,         Elmira Andrews MD        CC: No Recipients

## 2024-05-06 NOTE — PROGRESS NOTES
" [facility]  Dr. Elmira Andrews  [address] [place] [address]     Rondaexjoe Rodas is a 44 y.o. female who present for   Chief Complaint   Patient presents with    Migraine        Patient now working part-time.  Still continues to get headaches frequently.  Has appointment with neuro.  She is seeing a psychiatrist.  She is still taking Xanax frequently, headaches are behind her right eye.              Past Medical History:   Diagnosis Date    Anxiety     Depression     Dermatitis     Intractable migraine with aura 8/24/2021       Past Surgical History:   Procedure Laterality Date    REDUCTION MAMMAPLASTY         Social History     Occupational History    Occupation:     Tobacco Use    Smoking status: Former    Smokeless tobacco: Never   Vaping Use    Vaping Use: Never used   Substance and Sexual Activity    Alcohol use: Never    Drug use: Never    Sexual activity: Defer        Family History   Problem Relation Age of Onset    Bipolar disorder Biological Mother     Cervical cancer Biological Mother     Uterine cancer Biological Mother     Cancer Biological Father     Cancer Maternal Grandfather        Sertraline      Current Outpatient Medications:     ALPRAZolam (XANAX) 0.5 mg tablet, , Disp: , Rfl:     DULoxetine (CYMBALTA) 20 mg capsule, , Disp: , Rfl:     lamoTRIgine (LaMICtal) 100 mg tablet, Take 50 mg by mouth daily., Disp: , Rfl:     lurasidone (LATUDA) 60 mg tablet, Take 60 mg by mouth daily., Disp: , Rfl:     traZODone (DESYREL) 50 mg tablet, trazodone 50 mg tablet, Disp: , Rfl:     Review of Systems    Vitals:    05/06/24 1517   BP: 120/88   BP Location: Right upper arm   Patient Position: Sitting   Pulse: (!) 50   Temp: 36.6 °C (97.8 °F)   TempSrc: Oral   SpO2: 99%   Weight: 108 kg (239 lb)   Height: 1.664 m (5' 5.5\")     Body mass index is 39.17 kg/m².    Physical Exam  Vitals reviewed.   Eyes:      Conjunctiva/sclera: Conjunctivae normal. "   Cardiovascular:      Rate and Rhythm: Normal rate and regular rhythm.      Heart sounds: Normal heart sounds.   Pulmonary:      Effort: Pulmonary effort is normal.      Breath sounds: Normal breath sounds.   Abdominal:      Palpations: Abdomen is soft.      Tenderness: There is no abdominal tenderness.   Musculoskeletal:      Cervical back: Neck supple.      Right lower leg: No edema.      Left lower leg: No edema.   Neurological:      Mental Status: She is alert and oriented to person, place, and time.             Assessment   Problem List Items Addressed This Visit       Obesity     Patient plans to join the gym.  Discussed meal planning.         Intractable migraine with aura - Primary     Discussed the need to avoid polypharmacy.  She will discuss with neuro and her psychiatrist regarding side effects of medications.  She is on chronic use of Xanax.         Relevant Medications    DULoxetine (CYMBALTA) 20 mg capsule    Hypertension     Patient informed diastolic slightly high.  Heart rate is low.  Advised her to check the blood pressure and heart rate at home.                Elmira Andrews MD  5/6/2024

## 2024-05-07 NOTE — ASSESSMENT & PLAN NOTE
Patient informed diastolic slightly high.  Heart rate is low.  Advised her to check the blood pressure and heart rate at home.

## 2024-05-07 NOTE — ASSESSMENT & PLAN NOTE
Discussed the need to avoid polypharmacy.  She will discuss with neuro and her psychiatrist regarding side effects of medications.  She is on chronic use of Xanax.

## 2024-06-04 ENCOUNTER — OFFICE VISIT (OUTPATIENT)
Dept: FAMILY MEDICINE | Facility: CLINIC | Age: 44
End: 2024-06-04
Payer: COMMERCIAL

## 2024-06-04 VITALS
OXYGEN SATURATION: 96 % | SYSTOLIC BLOOD PRESSURE: 122 MMHG | BODY MASS INDEX: 38.09 KG/M2 | DIASTOLIC BLOOD PRESSURE: 80 MMHG | HEIGHT: 66 IN | HEART RATE: 70 BPM | WEIGHT: 237 LBS

## 2024-06-04 DIAGNOSIS — G43.119 INTRACTABLE MIGRAINE WITH AURA WITHOUT STATUS MIGRAINOSUS: Primary | ICD-10-CM

## 2024-06-04 DIAGNOSIS — F41.9 ANXIETY: ICD-10-CM

## 2024-06-04 DIAGNOSIS — E66.812 CLASS 2 OBESITY WITHOUT SERIOUS COMORBIDITY WITH BODY MASS INDEX (BMI) OF 39.0 TO 39.9 IN ADULT, UNSPECIFIED OBESITY TYPE: ICD-10-CM

## 2024-06-04 PROCEDURE — 3008F BODY MASS INDEX DOCD: CPT | Performed by: FAMILY MEDICINE

## 2024-06-04 PROCEDURE — 99213 OFFICE O/P EST LOW 20 MIN: CPT | Performed by: FAMILY MEDICINE

## 2024-06-04 PROCEDURE — 3074F SYST BP LT 130 MM HG: CPT | Performed by: FAMILY MEDICINE

## 2024-06-04 PROCEDURE — 3079F DIAST BP 80-89 MM HG: CPT | Performed by: FAMILY MEDICINE

## 2024-06-04 NOTE — LETTER
June 4, 2024     Patient: Loreto Rodas  YOB: 1980  Date of Visit: 6/4/2024    To Whom it May Concern:    Loreto Rodas was seen in my clinic on 6/4/2024 at 10:30 am. Please excuse Loreto for her absence from work on this day to make the appointment.  She will be able to return to work on Friday, February 7, 2024.    If you have any questions or concerns, please don't hesitate to call.         Sincerely,         Elmira Andrews MD        CC: No Recipients   No

## 2024-06-04 NOTE — ASSESSMENT & PLAN NOTE
Patient was inquiring about Ozempic.  Written information given on the need to follow protocol prior to getting weight loss meds approved.  Referred her to a nutritionist.

## 2024-06-04 NOTE — PROGRESS NOTES
[facility]  Dr. Elmira Andrews  [address] [place] [address]     Annealexjoe Rodas is a 44 y.o. female who present for   Chief Complaint   Patient presents with    Follow-up        She had to miss 5 days consecutively at work due to persistent headache.  She takes Excedrin as needed.  She will be seeing GYN to discuss going on progesterone.  She does have an appointment next month with neurologist.  She is trying to taper off of Latuda and increase Cymbalta which has worked for her mood.  She takes Xanax as needed for anxiety and panic attacks related to her work.  She is trying to get part-time hours.            Past Medical History:   Diagnosis Date    Anxiety     Depression     Dermatitis     Intractable migraine with aura 8/24/2021       Past Surgical History:   Procedure Laterality Date    REDUCTION MAMMAPLASTY         Social History     Occupational History    Occupation:     Tobacco Use    Smoking status: Former    Smokeless tobacco: Never   Vaping Use    Vaping Use: Never used   Substance and Sexual Activity    Alcohol use: Never    Drug use: Never    Sexual activity: Defer        Family History   Problem Relation Age of Onset    Bipolar disorder Biological Mother     Cervical cancer Biological Mother     Uterine cancer Biological Mother     Cancer Biological Father     Cancer Maternal Grandfather        Sertraline      Current Outpatient Medications:     ALPRAZolam (XANAX) 0.5 mg tablet, , Disp: , Rfl:     DULoxetine (CYMBALTA) 60 mg capsule, , Disp: , Rfl:     lamoTRIgine (LaMICtal) 100 mg tablet, Take 100 mg by mouth daily., Disp: , Rfl:     lurasidone (LATUDA) 60 mg tablet, Take 60 mg by mouth daily., Disp: , Rfl:     traZODone (DESYREL) 50 mg tablet, trazodone 50 mg tablet, Disp: , Rfl:     Review of Systems    Vitals:    06/04/24 1027   BP: 122/80   BP Location: Right upper arm   Patient Position: Sitting   Pulse: 70   SpO2: 96%   Weight: 108 kg  "(237 lb)   Height: 1.664 m (5' 5.5\")     Body mass index is 38.84 kg/m².    Physical Exam  Vitals reviewed.   Eyes:      Conjunctiva/sclera: Conjunctivae normal.   Cardiovascular:      Rate and Rhythm: Normal rate and regular rhythm.      Heart sounds: Normal heart sounds.   Pulmonary:      Effort: Pulmonary effort is normal.      Breath sounds: Normal breath sounds.   Abdominal:      Palpations: Abdomen is soft.      Tenderness: There is no abdominal tenderness.   Musculoskeletal:      Cervical back: Neck supple.      Right lower leg: No edema.      Left lower leg: No edema.   Neurological:      Mental Status: She is alert and oriented to person, place, and time.             Assessment   Problem List Items Addressed This Visit       Obesity     Patient was inquiring about Ozempic.  Written information given on the need to follow protocol prior to getting weight loss meds approved.  Referred her to a nutritionist.         Intractable migraine with aura - Primary     Discussed polypharmacy, interactions and side effects.  She is working with the psychiatrist to minimize medications.  She agreed to wean off of Xanax.         Anxiety           Elmira Andrews MD  6/4/2024     "

## 2024-06-04 NOTE — ASSESSMENT & PLAN NOTE
Discussed polypharmacy, interactions and side effects.  She is working with the psychiatrist to minimize medications.  She agreed to wean off of Xanax.

## 2025-06-05 ENCOUNTER — OFFICE VISIT (OUTPATIENT)
Dept: PRIMARY CARE | Facility: CLINIC | Age: 45
End: 2025-06-05
Payer: COMMERCIAL

## 2025-06-05 VITALS
TEMPERATURE: 98.1 F | BODY MASS INDEX: 36.73 KG/M2 | DIASTOLIC BLOOD PRESSURE: 91 MMHG | HEART RATE: 81 BPM | SYSTOLIC BLOOD PRESSURE: 144 MMHG | WEIGHT: 234 LBS | OXYGEN SATURATION: 99 % | HEIGHT: 67 IN

## 2025-06-05 DIAGNOSIS — Z00.00 ROUTINE GENERAL MEDICAL EXAMINATION AT A HEALTH CARE FACILITY: ICD-10-CM

## 2025-06-05 DIAGNOSIS — I10 HYPERTENSION, UNSPECIFIED TYPE: Primary | ICD-10-CM

## 2025-06-05 DIAGNOSIS — N95.1 PERIMENOPAUSAL SYMPTOMS: ICD-10-CM

## 2025-06-05 PROCEDURE — 3077F SYST BP >= 140 MM HG: CPT | Performed by: FAMILY MEDICINE

## 2025-06-05 PROCEDURE — 99396 PREV VISIT EST AGE 40-64: CPT | Performed by: FAMILY MEDICINE

## 2025-06-05 PROCEDURE — 3080F DIAST BP >= 90 MM HG: CPT | Performed by: FAMILY MEDICINE

## 2025-06-05 PROCEDURE — 3008F BODY MASS INDEX DOCD: CPT | Performed by: FAMILY MEDICINE

## 2025-06-05 RX ORDER — NORETHINDRONE ACETATE AND ETHINYL ESTRADIOL, ETHINYL ESTRADIOL AND FERROUS FUMARATE 1MG-10(24)
KIT ORAL
COMMUNITY

## 2025-06-05 RX ORDER — LISINOPRIL 5 MG/1
5 TABLET ORAL DAILY
Qty: 30 TABLET | Refills: 1 | Status: SHIPPED | OUTPATIENT
Start: 2025-06-05 | End: 2025-07-05

## 2025-06-05 ASSESSMENT — PATIENT HEALTH QUESTIONNAIRE - PHQ9
SUM OF ALL RESPONSES TO PHQ QUESTIONS 1-9: 15
SUM OF ALL RESPONSES TO PHQ9 QUESTIONS 1 & 2: 4

## 2025-06-05 NOTE — PROGRESS NOTES
[facility]  Dr. Elmira Andrews  [address] [place] [address]     Rondaexjoe Rodas is a 45 y.o. female who present for   Chief Complaint   Patient presents with    Blood pressure concerns        Patient here for a physical.  Her blood pressure was 153/104 at the dentist & they did not do the dental w/u. Beta blocker in the past made her tired.  Psych took her off of meds as she will be moving to [place].  Her depressive symptoms have flared up.  She thinks it is also situational.            Past Medical History:   Diagnosis Date    Anxiety     Depression     Dermatitis     Intractable migraine with aura 8/24/2021       Past Surgical History   Procedure Laterality Date    Reduction mammaplasty         Social History     Occupational History    Occupation:     Tobacco Use    Smoking status: Former    Smokeless tobacco: Never   Vaping Use    Vaping status: Never Used   Substance and Sexual Activity    Alcohol use: Never    Drug use: Never    Sexual activity: Defer        Family History   Problem Relation Name Age of Onset    Bipolar disorder Biological Mother      Cervical cancer Biological Mother      Uterine cancer Biological Mother      Cancer Biological Father      Cancer Maternal Grandfather         Sertraline      Current Outpatient Medications:     ALPRAZolam (XANAX) 0.5 mg tablet, , Disp: , Rfl:     lisinopriL (PRINIVIL) 5 mg tablet, Take 1 tablet (5 mg total) by mouth daily., Disp: 30 tablet, Rfl: 1    norethindrone-e.estradioL-iron (LO LOESTRIN FE) 1 mg-10 mcg (24)/10 mcg (2) per tablet, Take 1 tablet every day by oral route as directed., Disp: , Rfl:     traZODone (DESYREL) 50 mg tablet, trazodone 50 mg tablet, Disp: , Rfl:     DULoxetine (CYMBALTA) 60 mg capsule, , Disp: , Rfl:     lamoTRIgine (LaMICtal) 100 mg tablet, Take 100 mg by mouth daily. (Patient not taking: Reported on 6/5/2025), Disp: , Rfl:     lurasidone (LATUDA) 60 mg tablet, Take 60  "mg by mouth daily. (Patient not taking: Reported on 6/5/2025), Disp: , Rfl:     Review of Systems    Vitals:    06/05/25 1450   BP: (!) 144/91   BP Location: Right upper arm   Patient Position: Sitting   Pulse: 81   Temp: 36.7 °C (98.1 °F)   TempSrc: Temporal   SpO2: 99%   Weight: 106 kg (234 lb)   Height: 1.689 m (5' 6.5\")     Body mass index is 37.2 kg/m².    Physical Exam  Vitals reviewed.   Eyes:      Conjunctiva/sclera: Conjunctivae normal.   Cardiovascular:      Rate and Rhythm: Normal rate and regular rhythm.      Heart sounds: Normal heart sounds.   Pulmonary:      Effort: Pulmonary effort is normal.      Breath sounds: Normal breath sounds.   Abdominal:      Palpations: Abdomen is soft.      Tenderness: There is no abdominal tenderness.   Musculoskeletal:      Cervical back: Neck supple.      Right lower leg: No edema.      Left lower leg: No edema.   Neurological:      Mental Status: She is alert and oriented to person, place, and time.             Assessment   Problem List Items Addressed This Visit       Routine general medical examination at a health care facility    She will get a tetanus booster once her blood pressure is well-controlled.         Hypertension - Primary    She agreed to start back on medication.  After discussion she agreed to take lisinopril.         Relevant Medications    lisinopriL (PRINIVIL) 5 mg tablet    Perimenopausal symptoms    She was started on low Loestrin by her GYN.  I have cautioned her against using estrogen due to her high blood pressure.  She agreed to make an appointment with the GYN and discuss her options.                Elmira Andrews MD  6/5/2025     "

## 2025-06-05 NOTE — ASSESSMENT & PLAN NOTE
She was started on low Loestrin by her GYN.  I have cautioned her against using estrogen due to her high blood pressure.  She agreed to make an appointment with the GYN and discuss her options.

## 2025-07-21 ENCOUNTER — TELEPHONE (OUTPATIENT)
Dept: PRIMARY CARE | Facility: CLINIC | Age: 45
End: 2025-07-21
Payer: COMMERCIAL

## 2025-07-21 NOTE — TELEPHONE ENCOUNTER
Request for Medical Advice (NON-URGENT)   Patient PCP: Elmira Andrews MD  New or Existing Issue: existing  Question or Concern: Pt states she was put on lisinopril recently but is still getting high readings and wants to know if Dr. Andrews wants to increase her dosage. Please advise.  Preferred Pharmacy:   [id number] [place], [place] - [address] [facility]  [address] [place] [id number]  Phone: [phone] Fax: [phone]      The practice will reach out to discuss your Medical Question or Concern within 2 business days.

## 2025-09-03 ENCOUNTER — OFFICE VISIT (OUTPATIENT)
Dept: PRIMARY CARE | Facility: CLINIC | Age: 45
End: 2025-09-03
Payer: COMMERCIAL

## 2025-09-03 VITALS
SYSTOLIC BLOOD PRESSURE: 140 MMHG | RESPIRATION RATE: 16 BRPM | WEIGHT: 233 LBS | OXYGEN SATURATION: 98 % | HEART RATE: 95 BPM | HEIGHT: 67 IN | DIASTOLIC BLOOD PRESSURE: 98 MMHG | BODY MASS INDEX: 36.57 KG/M2

## 2025-09-03 DIAGNOSIS — N95.1 PERIMENOPAUSAL SYMPTOMS: ICD-10-CM

## 2025-09-03 DIAGNOSIS — E78.00 PURE HYPERCHOLESTEROLEMIA: ICD-10-CM

## 2025-09-03 DIAGNOSIS — F41.9 ANXIETY AND DEPRESSION: ICD-10-CM

## 2025-09-03 DIAGNOSIS — F32.A ANXIETY AND DEPRESSION: ICD-10-CM

## 2025-09-03 DIAGNOSIS — I10 HYPERTENSION, UNSPECIFIED TYPE: Primary | ICD-10-CM

## 2025-09-03 PROCEDURE — 3080F DIAST BP >= 90 MM HG: CPT | Performed by: FAMILY MEDICINE

## 2025-09-03 PROCEDURE — 99214 OFFICE O/P EST MOD 30 MIN: CPT | Performed by: FAMILY MEDICINE

## 2025-09-03 PROCEDURE — 3077F SYST BP >= 140 MM HG: CPT | Performed by: FAMILY MEDICINE

## 2025-09-03 PROCEDURE — 3008F BODY MASS INDEX DOCD: CPT | Performed by: FAMILY MEDICINE

## 2025-09-03 RX ORDER — LISINOPRIL 10 MG/1
10 TABLET ORAL DAILY
Qty: 90 TABLET | Refills: 3 | Status: SHIPPED | OUTPATIENT
Start: 2025-09-03 | End: 2026-03-02

## 2025-09-03 RX ORDER — LAMOTRIGINE 100 MG/1
200 TABLET ORAL DAILY
COMMUNITY
Start: 2025-09-03

## 2025-09-03 RX ORDER — ALPRAZOLAM 0.5 MG/1
TABLET ORAL
COMMUNITY
Start: 2025-09-03